# Patient Record
Sex: FEMALE | Race: BLACK OR AFRICAN AMERICAN | NOT HISPANIC OR LATINO | Employment: FULL TIME | URBAN - METROPOLITAN AREA
[De-identification: names, ages, dates, MRNs, and addresses within clinical notes are randomized per-mention and may not be internally consistent; named-entity substitution may affect disease eponyms.]

---

## 2020-07-22 ENCOUNTER — OFFICE VISIT (OUTPATIENT)
Dept: URGENT CARE | Facility: CLINIC | Age: 20
End: 2020-07-22
Payer: COMMERCIAL

## 2020-07-22 VITALS
TEMPERATURE: 99 F | HEIGHT: 60 IN | WEIGHT: 200 LBS | HEART RATE: 104 BPM | DIASTOLIC BLOOD PRESSURE: 84 MMHG | OXYGEN SATURATION: 98 % | SYSTOLIC BLOOD PRESSURE: 125 MMHG | BODY MASS INDEX: 39.27 KG/M2

## 2020-07-22 DIAGNOSIS — L03.90 CELLULITIS, UNSPECIFIED CELLULITIS SITE: Primary | ICD-10-CM

## 2020-07-22 PROCEDURE — 99214 PR OFFICE/OUTPT VISIT, EST, LEVL IV, 30-39 MIN: ICD-10-PCS | Mod: S$GLB,,, | Performed by: INTERNAL MEDICINE

## 2020-07-22 PROCEDURE — 99214 OFFICE O/P EST MOD 30 MIN: CPT | Mod: S$GLB,,, | Performed by: INTERNAL MEDICINE

## 2020-07-22 RX ORDER — MUPIROCIN 20 MG/G
OINTMENT TOPICAL
Qty: 22 G | Refills: 1 | Status: SHIPPED | OUTPATIENT
Start: 2020-07-22 | End: 2021-08-25

## 2020-07-22 RX ORDER — SULFAMETHOXAZOLE AND TRIMETHOPRIM 800; 160 MG/1; MG/1
1 TABLET ORAL 2 TIMES DAILY
Qty: 14 TABLET | Refills: 0 | Status: SHIPPED | OUTPATIENT
Start: 2020-07-22 | End: 2020-07-29

## 2020-07-22 NOTE — PATIENT INSTRUCTIONS
Discharge Instructions for Cellulitis  You have been diagnosed with cellulitis. This is an infection in the deepest layer of the skin. In some cases, the infection also affects the muscle. Cellulitis is caused by bacteria. The bacteria can enter the body through broken skin. This can happen with a cut, scratch, animal bite, or an insect bite that has been scratched. You may have been treated in the hospital with antibiotics and fluids. You will likely be given a prescription for antibiotics to take at home. This sheet will help you take care of yourself at home.  Home care  When you are home:  · Take the prescribed antibiotic medicine you are given as directed until it is gone. Take it even if you feel better. It treats the infection and stops it from returning. Not taking all the medicine can make future infections hard to treat.  · Keep the infected area clean.  · When possible, raise the infected area above the level of your heart. This helps keep swelling down.  · Talk with your healthcare provider if you are in pain. Ask what kind of over-the-counter medicine you can take for pain.  · Apply clean bandages as advised.  · Take your temperature once a day for a week.  · Wash your hands often to prevent spreading the infection.  In the future, wash your hands before and after you touch cuts, scratches, or bandages. This will help prevent infection.   When to call your healthcare provider  Call your healthcare provider immediately if you have any of the following:  · Difficulty or pain when moving the joints above or below the infected area  · Discharge or pus draining from the area  · Fever of 100.4°F (38°C) or higher, or as directed by your healthcare provider  · Pain that gets worse in or around the infected   · Redness that gets worse in or around the infected area, particularly if the area of redness expands to a wider area  · Shaking chills  · Swelling of the infected area  · Vomiting   Date Last Reviewed:  8/1/2016  © 0426-9897 [employer]. [address] [place] [address], [place] [address]. All rights reserved. This information is not intended as a substitute for professional medical care. Always follow your healthcare professional's instructions.

## 2020-07-22 NOTE — PROGRESS NOTES
Subjective:       Patient ID: Sera Chicas is a 20 y.o. female.    Vitals:  height is 5' (1.524 m) and weight is 90.7 kg (200 lb). Her temperature is 98.9 °F (37.2 °C). Her blood pressure is 125/84 and her pulse is 104. Her oxygen saturation is 98%.     Chief Complaint: Rash    Patient presents to clinic today for a bump on her vaginal area that started approximately 1 week ago. Patient states she did start doing sitz baths that seemed to help clear up the symptoms, but it has not fully healed. Patient states she does have some swelling in the area.     Other  This is a new problem. The current episode started in the past 7 days. The problem occurs constantly. The problem has been unchanged. Pertinent negatives include no abdominal pain, anorexia, arthralgias, change in bowel habit, chest pain, chills, congestion, coughing, diaphoresis, fatigue, fever, headaches, joint swelling, myalgias, nausea, neck pain, numbness, rash, sore throat, swollen glands, urinary symptoms, vertigo, visual change, vomiting or weakness. Treatments tried: sitz bath  The treatment provided mild relief.       Constitution: Negative for chills, sweating, fatigue and fever.   HENT: Negative for congestion and sore throat.    Neck: Negative for neck pain and painful lymph nodes.   Cardiovascular: Negative for chest pain.   Respiratory: Negative for cough.    Gastrointestinal: Negative for abdominal pain, nausea and vomiting.   Genitourinary: Positive for vaginal pain. Negative for dysuria, frequency, urgency, urine decreased, hematuria, history of kidney stones, painful menstruation, irregular menstruation, missed menses, heavy menstrual bleeding, ovarian cysts, genital trauma, vaginal discharge, vaginal bleeding, vaginal odor, painful intercourse, genital sore, painful ejaculation and pelvic pain.   Musculoskeletal: Negative for joint pain, joint swelling, back pain and muscle ache.   Skin: Negative for rash and lesion.   Neurological:  Negative for history of vertigo, headaches and numbness.   Hematologic/Lymphatic: Negative for swollen lymph nodes.       Objective:      Physical Exam   Constitutional: She is oriented to person, place, and time. She appears well-developed.   HENT:   Head: Normocephalic and atraumatic.   Ears:   Right Ear: External ear normal.   Left Ear: External ear normal.   Nose: Nose normal.   Mouth/Throat: Mucous membranes are normal.   Eyes: Conjunctivae and lids are normal.   Neck: Trachea normal and full passive range of motion without pain. Neck supple.   Cardiovascular: Normal rate, regular rhythm and normal heart sounds.   Pulmonary/Chest: Effort normal and breath sounds normal. No respiratory distress.   Abdominal: Soft. Normal appearance and bowel sounds are normal. She exhibits no distension, no abdominal bruit, no pulsatile midline mass and no mass. There is no abdominal tenderness.   Musculoskeletal: Normal range of motion.   Neurological: She is alert and oriented to person, place, and time. She has normal strength.   Skin: Skin is warm, dry, intact, not diaphoretic and not pale. Lesions:  lesion (Multiple lesions noted left labia examined with chaperone (Analilia WOODS))Psychiatric: Her speech is normal and behavior is normal. Judgment and thought content normal.   Nursing note and vitals reviewed.        Assessment:       1. Cellulitis, unspecified cellulitis site        Plan:         Cellulitis, unspecified cellulitis site  -     sulfamethoxazole-trimethoprim 800-160mg (BACTRIM DS) 800-160 mg Tab; Take 1 tablet by mouth 2 (two) times daily. for 7 days  Dispense: 14 tablet; Refill: 0  -     mupirocin (BACTROBAN) 2 % ointment; Apply to affected area 3 times daily  Dispense: 22 g; Refill: 1      Patient Instructions       Discharge Instructions for Cellulitis  You have been diagnosed with cellulitis. This is an infection in the deepest layer of the skin. In some cases, the infection also affects the muscle. Cellulitis  is caused by bacteria. The bacteria can enter the body through broken skin. This can happen with a cut, scratch, animal bite, or an insect bite that has been scratched. You may have been treated in the hospital with antibiotics and fluids. You will likely be given a prescription for antibiotics to take at home. This sheet will help you take care of yourself at home.  Home care  When you are home:  · Take the prescribed antibiotic medicine you are given as directed until it is gone. Take it even if you feel better. It treats the infection and stops it from returning. Not taking all the medicine can make future infections hard to treat.  · Keep the infected area clean.  · When possible, raise the infected area above the level of your heart. This helps keep swelling down.  · Talk with your healthcare provider if you are in pain. Ask what kind of over-the-counter medicine you can take for pain.  · Apply clean bandages as advised.  · Take your temperature once a day for a week.  · Wash your hands often to prevent spreading the infection.  In the future, wash your hands before and after you touch cuts, scratches, or bandages. This will help prevent infection.   When to call your healthcare provider  Call your healthcare provider immediately if you have any of the following:  · Difficulty or pain when moving the joints above or below the infected area  · Discharge or pus draining from the area  · Fever of 100.4°F (38°C) or higher, or as directed by your healthcare provider  · Pain that gets worse in or around the infected   · Redness that gets worse in or around the infected area, particularly if the area of redness expands to a wider area  · Shaking chills  · Swelling of the infected area  · Vomiting   Date Last Reviewed: 8/1/2016  © 7723-5262 [employer]. [address] [place] [address], [place] [address]. All rights reserved. This information is not intended as a substitute for professional medical care. Always  follow your healthcare professional's instructions.           My notes were dictated with [employer]ency Software. Any misspellings or nonsensical grammar should be attributed to its use and allowances made for errors and typographic syntactical error(s).

## 2020-07-23 ENCOUNTER — TELEPHONE (OUTPATIENT)
Dept: URGENT CARE | Facility: CLINIC | Age: 20
End: 2020-07-23

## 2020-07-23 RX ORDER — CLINDAMYCIN HYDROCHLORIDE 300 MG/1
300 CAPSULE ORAL 3 TIMES DAILY
Qty: 30 CAPSULE | Refills: 0 | Status: SHIPPED | OUTPATIENT
Start: 2020-07-23 | End: 2020-08-02

## 2021-04-22 ENCOUNTER — OFFICE VISIT (OUTPATIENT)
Dept: URGENT CARE | Facility: CLINIC | Age: 21
End: 2021-04-22
Payer: COMMERCIAL

## 2021-04-22 VITALS
WEIGHT: 200 LBS | TEMPERATURE: 98 F | DIASTOLIC BLOOD PRESSURE: 82 MMHG | SYSTOLIC BLOOD PRESSURE: 128 MMHG | HEART RATE: 84 BPM | BODY MASS INDEX: 39.27 KG/M2 | HEIGHT: 60 IN | RESPIRATION RATE: 18 BRPM | OXYGEN SATURATION: 98 %

## 2021-04-22 DIAGNOSIS — R10.2 VAGINAL PAIN: Primary | ICD-10-CM

## 2021-04-22 DIAGNOSIS — N89.8 VAGINAL DISCHARGE: ICD-10-CM

## 2021-04-22 LAB
B-HCG UR QL: NEGATIVE
BILIRUB UR QL STRIP: NEGATIVE
CTP QC/QA: YES
GLUCOSE UR QL STRIP: NEGATIVE
KETONES UR QL STRIP: NEGATIVE
LEUKOCYTE ESTERASE UR QL STRIP: NEGATIVE
PH, POC UA: 5 (ref 5–8)
POC BLOOD, URINE: NEGATIVE
POC NITRATES, URINE: NEGATIVE
PROT UR QL STRIP: NEGATIVE
SP GR UR STRIP: 1.02 (ref 1–1.03)
UROBILINOGEN UR STRIP-ACNC: NORMAL (ref 0.1–1.1)

## 2021-04-22 PROCEDURE — 81025 URINE PREGNANCY TEST: CPT | Mod: S$GLB,,, | Performed by: PHYSICIAN ASSISTANT

## 2021-04-22 PROCEDURE — 1125F AMNT PAIN NOTED PAIN PRSNT: CPT | Mod: S$GLB,,, | Performed by: PHYSICIAN ASSISTANT

## 2021-04-22 PROCEDURE — 81025 POCT URINE PREGNANCY: ICD-10-PCS | Mod: S$GLB,,, | Performed by: PHYSICIAN ASSISTANT

## 2021-04-22 PROCEDURE — 3008F BODY MASS INDEX DOCD: CPT | Mod: CPTII,S$GLB,, | Performed by: PHYSICIAN ASSISTANT

## 2021-04-22 PROCEDURE — 3008F PR BODY MASS INDEX (BMI) DOCUMENTED: ICD-10-PCS | Mod: CPTII,S$GLB,, | Performed by: PHYSICIAN ASSISTANT

## 2021-04-22 PROCEDURE — 99214 OFFICE O/P EST MOD 30 MIN: CPT | Mod: 25,S$GLB,, | Performed by: PHYSICIAN ASSISTANT

## 2021-04-22 PROCEDURE — 81003 POCT URINALYSIS, DIPSTICK, AUTOMATED, W/O SCOPE: ICD-10-PCS | Mod: QW,S$GLB,, | Performed by: PHYSICIAN ASSISTANT

## 2021-04-22 PROCEDURE — 81003 URINALYSIS AUTO W/O SCOPE: CPT | Mod: QW,S$GLB,, | Performed by: PHYSICIAN ASSISTANT

## 2021-04-22 PROCEDURE — 99214 PR OFFICE/OUTPT VISIT, EST, LEVL IV, 30-39 MIN: ICD-10-PCS | Mod: 25,S$GLB,, | Performed by: PHYSICIAN ASSISTANT

## 2021-04-22 PROCEDURE — 1125F PR PAIN SEVERITY QUANTIFIED, PAIN PRESENT: ICD-10-PCS | Mod: S$GLB,,, | Performed by: PHYSICIAN ASSISTANT

## 2021-04-22 RX ORDER — METRONIDAZOLE 500 MG/1
500 TABLET ORAL EVERY 12 HOURS
Qty: 14 TABLET | Refills: 0 | Status: SHIPPED | OUTPATIENT
Start: 2021-04-22 | End: 2021-04-29

## 2021-04-22 RX ORDER — DOXYCYCLINE 100 MG/1
100 CAPSULE ORAL 2 TIMES DAILY
Qty: 14 CAPSULE | Refills: 0 | Status: SHIPPED | OUTPATIENT
Start: 2021-04-22 | End: 2021-04-29

## 2021-04-22 RX ORDER — FLUCONAZOLE 150 MG/1
150 TABLET ORAL ONCE
Qty: 1 TABLET | Refills: 1 | Status: SHIPPED | OUTPATIENT
Start: 2021-04-22 | End: 2021-04-22

## 2021-04-26 ENCOUNTER — TELEPHONE (OUTPATIENT)
Dept: URGENT CARE | Facility: CLINIC | Age: 21
End: 2021-04-26

## 2021-04-26 LAB
A VAGINAE DNA VAG QL NAA+PROBE: ABNORMAL SCORE
BVAB2 DNA VAG QL NAA+PROBE: ABNORMAL SCORE
C ALBICANS DNA VAG QL NAA+PROBE: NEGATIVE
C GLABRATA DNA VAG QL NAA+PROBE: NEGATIVE
C TRACH DNA VAG QL NAA+PROBE: NEGATIVE
MEGA1 DNA VAG QL NAA+PROBE: ABNORMAL SCORE
N GONORRHOEA DNA VAG QL NAA+PROBE: NEGATIVE
T VAGINALIS DNA VAG QL NAA+PROBE: NEGATIVE

## 2021-04-27 ENCOUNTER — TELEPHONE (OUTPATIENT)
Dept: URGENT CARE | Facility: CLINIC | Age: 21
End: 2021-04-27

## 2021-05-17 ENCOUNTER — OFFICE VISIT (OUTPATIENT)
Dept: URGENT CARE | Facility: CLINIC | Age: 21
End: 2021-05-17
Payer: COMMERCIAL

## 2021-05-17 VITALS
SYSTOLIC BLOOD PRESSURE: 147 MMHG | DIASTOLIC BLOOD PRESSURE: 99 MMHG | HEART RATE: 67 BPM | HEIGHT: 60 IN | BODY MASS INDEX: 38.87 KG/M2 | OXYGEN SATURATION: 99 % | TEMPERATURE: 98 F | WEIGHT: 198 LBS | RESPIRATION RATE: 16 BRPM

## 2021-05-17 DIAGNOSIS — R51.9 ACUTE NONINTRACTABLE HEADACHE, UNSPECIFIED HEADACHE TYPE: Primary | ICD-10-CM

## 2021-05-17 PROCEDURE — 3008F BODY MASS INDEX DOCD: CPT | Mod: CPTII,S$GLB,, | Performed by: FAMILY MEDICINE

## 2021-05-17 PROCEDURE — 3008F PR BODY MASS INDEX (BMI) DOCUMENTED: ICD-10-PCS | Mod: CPTII,S$GLB,, | Performed by: FAMILY MEDICINE

## 2021-05-17 PROCEDURE — 99213 PR OFFICE/OUTPT VISIT, EST, LEVL III, 20-29 MIN: ICD-10-PCS | Mod: S$GLB,,, | Performed by: FAMILY MEDICINE

## 2021-05-17 PROCEDURE — 99213 OFFICE O/P EST LOW 20 MIN: CPT | Mod: S$GLB,,, | Performed by: FAMILY MEDICINE

## 2021-05-17 RX ORDER — PROMETHAZINE HYDROCHLORIDE 25 MG/1
25 TABLET ORAL NIGHTLY
Qty: 30 TABLET | Refills: 1 | Status: SHIPPED | OUTPATIENT
Start: 2021-05-17 | End: 2021-08-25

## 2021-08-19 PROBLEM — N61.1 ABSCESS OF LEFT BREAST: Status: ACTIVE | Noted: 2021-08-19

## 2021-08-25 PROBLEM — N60.82 SEBACEOUS CYST OF SKIN OF LEFT BREAST: Status: ACTIVE | Noted: 2021-08-25

## 2021-08-25 PROBLEM — D24.2 FIBROADENOMA OF BREAST, LEFT: Status: ACTIVE | Noted: 2021-08-25

## 2022-05-13 ENCOUNTER — OFFICE VISIT (OUTPATIENT)
Dept: URGENT CARE | Facility: CLINIC | Age: 22
End: 2022-05-13

## 2022-05-13 VITALS
OXYGEN SATURATION: 96 % | RESPIRATION RATE: 16 BRPM | SYSTOLIC BLOOD PRESSURE: 117 MMHG | DIASTOLIC BLOOD PRESSURE: 79 MMHG | TEMPERATURE: 98 F | HEART RATE: 74 BPM

## 2022-05-13 DIAGNOSIS — J32.9 SINUSITIS, UNSPECIFIED CHRONICITY, UNSPECIFIED LOCATION: ICD-10-CM

## 2022-05-13 DIAGNOSIS — B34.9 VIRAL SYNDROME: Primary | ICD-10-CM

## 2022-05-13 DIAGNOSIS — R51.9 NONINTRACTABLE HEADACHE, UNSPECIFIED CHRONICITY PATTERN, UNSPECIFIED HEADACHE TYPE: ICD-10-CM

## 2022-05-13 DIAGNOSIS — R52 GENERALIZED BODY ACHES: ICD-10-CM

## 2022-05-13 DIAGNOSIS — R11.0 NAUSEA: ICD-10-CM

## 2022-05-13 LAB
CTP QC/QA: YES
POC MOLECULAR INFLUENZA A AGN: NEGATIVE
POC MOLECULAR INFLUENZA B AGN: NEGATIVE

## 2022-05-13 PROCEDURE — 87502 INFLUENZA DNA AMP PROBE: CPT | Mod: QW,TIER,S$GLB, | Performed by: PHYSICIAN ASSISTANT

## 2022-05-13 PROCEDURE — 99203 OFFICE O/P NEW LOW 30 MIN: CPT | Mod: TIER,S$GLB,, | Performed by: PHYSICIAN ASSISTANT

## 2022-05-13 PROCEDURE — 99203 PR OFFICE/OUTPT VISIT, NEW, LEVL III, 30-44 MIN: ICD-10-PCS | Mod: TIER,S$GLB,, | Performed by: PHYSICIAN ASSISTANT

## 2022-05-13 PROCEDURE — 87502 POCT INFLUENZA A/B MOLECULAR: ICD-10-PCS | Mod: QW,TIER,S$GLB, | Performed by: PHYSICIAN ASSISTANT

## 2022-05-13 RX ORDER — IBUPROFEN 800 MG/1
800 TABLET ORAL 3 TIMES DAILY
Qty: 30 TABLET | Refills: 0 | Status: SHIPPED | OUTPATIENT
Start: 2022-05-13 | End: 2022-12-20

## 2022-05-13 RX ORDER — ONDANSETRON 4 MG/1
4 TABLET, ORALLY DISINTEGRATING ORAL EVERY 8 HOURS PRN
Qty: 12 TABLET | Refills: 0 | Status: SHIPPED | OUTPATIENT
Start: 2022-05-13 | End: 2022-12-20

## 2022-05-13 RX ORDER — ELETRIPTAN HYDROBROMIDE 20 MG/1
20 TABLET, FILM COATED ORAL
Qty: 15 TABLET | Refills: 0 | Status: SHIPPED | OUTPATIENT
Start: 2022-05-13 | End: 2022-12-20

## 2022-05-13 RX ORDER — PREDNISONE 20 MG/1
20 TABLET ORAL 2 TIMES DAILY
Qty: 10 TABLET | Refills: 0 | Status: SHIPPED | OUTPATIENT
Start: 2022-05-13 | End: 2022-05-18

## 2022-05-13 NOTE — PROGRESS NOTES
Subjective:       Patient ID: Sera Chicas is a 21 y.o. female.    Vitals:  temperature is 98.3 °F (36.8 °C). Her blood pressure is 117/79 and her pulse is 74. Her respiration is 16 and oxygen saturation is 96%.     Chief Complaint: Generalized Body Aches    Pt presents to urgent care for evaluation of headache, bodyaches, sinus congestion, post nasal drip, sweats/chills x 7 days off and on.  She reports that her symptoms seemed consistent with previous migraines.  She reports light sensitivity however denies any vision changes or dizziness.  She has felt nauseated however denies any emetic episodes, abdominal pain, or diarrhea.  She denies any sore throat or cough.   Pt has had covid vaccines. Pain 7/10.  Pt had a negative home covid test last night.    Other  This is a new problem. The current episode started in the past 7 days. The problem occurs constantly. The problem has been gradually worsening. Associated symptoms include chills, congestion, headaches, myalgias and nausea. Pertinent negatives include no abdominal pain, chest pain, coughing, fever, rash, sore throat or vomiting. Treatments tried: excedrin, ibuprofin. The treatment provided mild relief.       Constitution: Positive for chills. Negative for fever.   HENT: Positive for congestion. Negative for sore throat.    Cardiovascular: Negative for chest pain.   Respiratory: Negative for cough and shortness of breath.    Gastrointestinal: Positive for nausea. Negative for abdominal pain, vomiting, constipation and diarrhea.   Musculoskeletal: Positive for muscle ache.   Skin: Negative for rash.   Neurological: Positive for headaches.       Objective:      Physical Exam   Constitutional: She is oriented to person, place, and time. She appears well-developed. She is cooperative.  Non-toxic appearance. She does not appear ill. No distress.   HENT:   Head: Normocephalic and atraumatic.   Ears:   Right Ear: Hearing, tympanic membrane, external ear and ear canal  normal. Tympanic membrane is not injected, not erythematous and not bulging. No middle ear effusion. impacted cerumen  Left Ear: Hearing, external ear and ear canal normal. Tympanic membrane is not injected, not erythematous and not bulging.  No middle ear effusion. impacted cerumen  Nose: No mucosal edema, rhinorrhea, nasal deformity or congestion. No epistaxis. Right sinus exhibits maxillary sinus tenderness and frontal sinus tenderness. Left sinus exhibits maxillary sinus tenderness and frontal sinus tenderness.   Mouth/Throat: Uvula is midline, oropharynx is clear and moist and mucous membranes are normal. No trismus in the jaw. Normal dentition. No uvula swelling. No oropharyngeal exudate, posterior oropharyngeal edema, posterior oropharyngeal erythema, tonsillar abscesses or cobblestoning.   Eyes: Conjunctivae and lids are normal. Right eye exhibits no discharge. Left eye exhibits no discharge. No scleral icterus.   Neck: Trachea normal and phonation normal. Neck supple. No edema present. No erythema present. No neck rigidity present.   Cardiovascular: Normal rate, regular rhythm, normal heart sounds and normal pulses.   No murmur heard.Exam reveals no gallop and no friction rub.   Pulmonary/Chest: Effort normal and breath sounds normal. No stridor. No respiratory distress. She has no decreased breath sounds. She has no wheezes. She has no rhonchi. She has no rales.         Comments: NAD; CTA bilaterally.     Abdominal: Normal appearance.   Musculoskeletal: Normal range of motion.         General: No deformity. Normal range of motion.   Lymphadenopathy:        Head (right side): No submandibular and no tonsillar adenopathy present.        Head (left side): No submandibular and no tonsillar adenopathy present.     She has no cervical adenopathy.        Right cervical: No superficial cervical and no posterior cervical adenopathy present.       Left cervical: No superficial cervical and no posterior cervical  adenopathy present.   Neurological: She is alert and oriented to person, place, and time. She displays no weakness, facial symmetry and no dysarthria. No cranial nerve deficit. She exhibits normal muscle tone. Coordination and gait normal.   Skin: Skin is warm, dry, intact, not diaphoretic and not pale.   Psychiatric: Her speech is normal and behavior is normal. Judgment and thought content normal.   Nursing note and vitals reviewed.        Results for orders placed or performed in visit on 05/13/22   POCT Influenza A/B MOLECULAR   Result Value Ref Range    POC Molecular Influenza A Ag Negative Negative, Not Reported    POC Molecular Influenza B Ag Negative Negative, Not Reported     Acceptable Yes        Assessment:       1. Viral syndrome    2. Generalized body aches    3. Nausea    4. Nonintractable headache, unspecified chronicity pattern, unspecified headache type    5. Sinusitis, unspecified chronicity, unspecified location        Plan:     flu testing is negative at this time and reviewed results with patient.  Discussed that her symptoms most consistent with viral syndrome.  To help treat her sinusitis and for inflammation associated with her headache, prednisone prescribed as well as Relpax as abortive migraine therapy.  Zofran prescribed for nausea.  Ibuprofen 800 for any body aches.  Rest and fluids.  Follow up with your family provider for any persistent or worsening symptoms. Patient verbalized understanding and all of their questions were answered.       Viral syndrome    Generalized body aches  -     POCT Influenza A/B MOLECULAR  -     ibuprofen (ADVIL,MOTRIN) 800 MG tablet; Take 1 tablet (800 mg total) by mouth 3 (three) times daily.  Dispense: 30 tablet; Refill: 0    Nausea  -     ondansetron (ZOFRAN-ODT) 4 MG TbDL; Take 1 tablet (4 mg total) by mouth every 8 (eight) hours as needed (nausea).  Dispense: 12 tablet; Refill: 0    Nonintractable headache, unspecified chronicity pattern,  unspecified headache type  -     predniSONE (DELTASONE) 20 MG tablet; Take 1 tablet (20 mg total) by mouth 2 (two) times daily. for 5 days  Dispense: 10 tablet; Refill: 0  -     eletriptan (RELPAX) 20 MG tablet; Take 1 tablet (20 mg total) by mouth as needed for Migraine. may repeat in 2 hours if necessary. Do not take more than 2 tab in 24 hours.  Dispense: 15 tablet; Refill: 0    Sinusitis, unspecified chronicity, unspecified location      Patient Instructions     Patient Instructions   -Below are suggestions for symptomatic relief:              -Tylenol every 4 hours OR ibuprofen every 6 hours as needed for pain/fever.              -Salt water gargles to soothe throat pain.              -Chloroseptic spray also helps to numb throat pain.              -Nasal saline spray reduces inflammation and dryness.              -Warm face compresses to help with facial sinus pain/pressure.              -Vicks vapor rub at night.              -Flonase OTC or Nasacort OTC for nasal congestion.              -Simple foods like chicken noodle soup.              -Delsym helps with coughing at night              -Zyrtec/Claritin during the day & Benadryl at night may help with allergies.                If you DO NOT have Hypertension or any history of palpitations, it is ok to take over the counter Sudafed or Mucinex D or Allegra-D or Claritin-D or Zyrtec-D.  If you do take one of the above, it is ok to combine that with plain over the counter Mucinex or Allegra or Claritin or Zyrtec. If, for example, you are taking Zyrtec -D, you can combine that with Mucinex, but not Mucinex-D.  If you are taking Mucinex-D, you can combine that with plain Allegra or Claritin or Zyrtec.   If you DO have Hypertension or palpitations, it is safe to take Coricidin HBP for relief of sinus symptoms.    Please follow up with your Primary care provider within 2-5 days if your signs and symptoms have not resolved or worsen.     If your condition worsens  or fails to improve we recommend that you receive another evaluation at the emergency room immediately or contact your primary medical clinic to discuss your concerns.   You must understand that you have received an Urgent Care treatment only and that you may be released before all of your medical problems are known or treated. You, the patient, will arrange for follow up care as instructed.     RED FLAGS/WARNING SYMPTOMS DISCUSSED WITH PATIENT THAT WOULD WARRANT EMERGENT MEDICAL ATTENTION. PATIENT VERBALIZED UNDERSTANDING.

## 2022-05-13 NOTE — LETTER
May 13, 2022      [facility]  [address] [place] [address], [address]  [place] [address]  Phone: [phone]  Fax: [phone]       Patient: Sera Chicas   YOB: 2000  Date of Visit: 05/13/2022    To Whom It May Concern:    Rosalio Chicas  was at [facility] on 05/13/2022. The patient may return to work/school on 5/16/2022 with no restrictions. If you have any questions or concerns, or if I can be of further assistance, please do not hesitate to contact me.    Sincerely,      Tessa Wiggins PA-C

## 2023-08-28 ENCOUNTER — OFFICE VISIT (OUTPATIENT)
Dept: URGENT CARE | Facility: CLINIC | Age: 23
End: 2023-08-28
Payer: COMMERCIAL

## 2023-08-28 VITALS
HEIGHT: 60 IN | SYSTOLIC BLOOD PRESSURE: 110 MMHG | WEIGHT: 207 LBS | TEMPERATURE: 99 F | BODY MASS INDEX: 40.64 KG/M2 | HEART RATE: 78 BPM | OXYGEN SATURATION: 96 % | DIASTOLIC BLOOD PRESSURE: 72 MMHG | RESPIRATION RATE: 20 BRPM

## 2023-08-28 DIAGNOSIS — J02.9 SORE THROAT: ICD-10-CM

## 2023-08-28 DIAGNOSIS — R05.9 COUGH, UNSPECIFIED TYPE: ICD-10-CM

## 2023-08-28 DIAGNOSIS — U07.1 COVID: Primary | ICD-10-CM

## 2023-08-28 DIAGNOSIS — J45.20 MILD INTERMITTENT ASTHMA WITHOUT COMPLICATION: ICD-10-CM

## 2023-08-28 DIAGNOSIS — U07.1 COVID-19 VIRUS DETECTED: ICD-10-CM

## 2023-08-28 LAB
CTP QC/QA: YES
CTP QC/QA: YES
MOLECULAR STREP A: NEGATIVE
SARS-COV-2 RDRP RESP QL NAA+PROBE: POSITIVE

## 2023-08-28 PROCEDURE — 87635 SARS-COV-2 COVID-19 AMP PRB: CPT | Mod: PBBFAC | Performed by: NURSE PRACTITIONER

## 2023-08-28 PROCEDURE — 99214 OFFICE O/P EST MOD 30 MIN: CPT | Mod: PBBFAC | Performed by: NURSE PRACTITIONER

## 2023-08-28 PROCEDURE — 99213 OFFICE O/P EST LOW 20 MIN: CPT | Mod: S$PBB,,, | Performed by: NURSE PRACTITIONER

## 2023-08-28 PROCEDURE — 87651 STREP A DNA AMP PROBE: CPT | Mod: PBBFAC | Performed by: NURSE PRACTITIONER

## 2023-08-28 PROCEDURE — 99213 PR OFFICE/OUTPT VISIT, EST, LEVL III, 20-29 MIN: ICD-10-PCS | Mod: S$PBB,,, | Performed by: NURSE PRACTITIONER

## 2023-08-28 RX ORDER — ALBUTEROL SULFATE 90 UG/1
2 AEROSOL, METERED RESPIRATORY (INHALATION) EVERY 6 HOURS PRN
Qty: 1 G | Refills: 2 | Status: SHIPPED | OUTPATIENT
Start: 2023-08-28

## 2023-08-28 NOTE — LETTER
August 28, 2023      [facility]  [facility] [address]  Phone: [phone]       Patient: Sera Chicas   YOB: 2000  Date of Visit: 08/28/2023    To Whom It May Concern:    Rosalio Chicas  was at [facility] on 08/28/2023. The patient may return to work/school on 9/2/2023 with no restrictions. If you have any questions or concerns, or if I can be of further assistance, please do not hesitate to contact me.    Sincerely,    MATTIE Cordon

## 2023-08-28 NOTE — PROGRESS NOTES
Subjective:      Patient ID: Sera Chicas is a 23 y.o. female.    Vitals:  height is 5' (1.524 m) and weight is 93.9 kg (207 lb). Her oral temperature is 98.8 °F (37.1 °C). Her blood pressure is 110/72 and her pulse is 78. Her respiration is 20 and oxygen saturation is 96%.     Chief Complaint: Covid Exposure, Nasal Congestion, Cough, Headache, Sore Throat, Fatigue (Started 3 days ago. ), and Shortness of Breath (History of Asthma)    HPI as stated in CC. Denies shortness of breath currently.    Constitution: Positive for fatigue.   HENT:  Positive for sore throat.    Respiratory:  Positive for cough and shortness of breath.    Neurological:  Positive for headaches.      Objective:     Physical Exam   Constitutional: She appears well-developed.  Non-toxic appearance. She does not appear ill. No distress.   HENT:   Head: Atraumatic.   Ears:   Right Ear: Tympanic membrane normal.   Left Ear: Tympanic membrane normal.   Nose: Congestion present. No rhinorrhea or purulent discharge. Right sinus exhibits no maxillary sinus tenderness and no frontal sinus tenderness. Left sinus exhibits no maxillary sinus tenderness and no frontal sinus tenderness.   Mouth/Throat: Mucous membranes are moist.   Eyes: Conjunctivae are normal. Right eye exhibits no discharge. Left eye exhibits no discharge. Extraocular movement intact   Neck: Neck supple.   Cardiovascular: Normal rate and regular rhythm.   Pulmonary/Chest: Effort normal and breath sounds normal. No stridor. No respiratory distress. She has no wheezes. She has no rhonchi. She has no rales. She exhibits no tenderness.         Comments: Pt is apprehensive about deep breath, denies pleuritic chest pain.    Abdominal: Normal appearance and bowel sounds are normal. She exhibits no distension and no mass. Soft. There is no abdominal tenderness. No hernia.   Musculoskeletal: Normal range of motion.         General: Normal range of motion.   Lymphadenopathy:     She has no cervical  adenopathy.   Neurological: no focal deficit. She is alert.   Skin: Skin is warm, dry and not diaphoretic.   Psychiatric: Her behavior is normal. Mood, judgment and thought content normal.   Nursing note and vitals reviewed.      Assessment:     1. COVID    2. Mild intermittent asthma without complication    3. Cough, unspecified type    4. Sore throat      Results for orders placed or performed in visit on 08/28/23   POCT COVID-19 Rapid Screening   Result Value Ref Range    POC Rapid COVID Positive (A) Negative     Acceptable Yes    POCT Strep A, Molecular   Result Value Ref Range    Molecular Strep A, POC Negative Negative     Acceptable Yes        Plan:     - OTC cold/flu products as desired for symptoms  - Plenty of fluids  - Home from work/school  - Tylenol or Motrin for pain/fever    Go to the ER if you experience chest pain with shortness of breath, shortness of breath when moving around your house, high fevers 103.0+, excessive vomiting/diarrhea, or general distress.     COVID  -     nirmatrelvir-ritonavir 150-100 mg DsPk; Take 1 tablet by mouth 2 (two) times daily. Take 2 tablets by mouth 2 (two) times daily for 5 days. Each dose contains 1 nirmatrelvir (pink tablet) and 1 ritonavir (white tablet). Take both tablets together for 5 days  Dispense: 20 tablet; Refill: 0    Mild intermittent asthma without complication  -     albuterol (PROVENTIL HFA) 90 mcg/actuation inhaler; Inhale 2 puffs into the lungs every 6 (six) hours as needed for Wheezing. Rescue  Dispense: 1 g; Refill: 2    Cough, unspecified type  -     POCT COVID-19 Rapid Screening  -     albuterol (PROVENTIL HFA) 90 mcg/actuation inhaler; Inhale 2 puffs into the lungs every 6 (six) hours as needed for Wheezing. Rescue  Dispense: 1 g; Refill: 2    Sore throat  -     POCT Strep A, Molecular

## 2023-08-28 NOTE — PATIENT INSTRUCTIONS
Please see provided patient education for guidance.  - OTC cold/flu products as desired for symptoms  - Plenty of fluids  - Home from work/school  - Tylenol or Motrin for pain/fever    I recommend a 7 day quarantine from day of symptom onset.   COVID is contagious for an average of EIGHT DAYS, starting from Day 1 that you have symptoms.  You can spread COVID 2-3 days before you have symptoms.  The CDC permits 5 days of quarantine. If you choose to quarantine for 5 days, wear a hospital-grade, surgical mask over your nose and mouth when around other people and in public through day 8.  A cloth mask provides no protection from spreading or mac COVID.    Go to the ER if you experience chest pain with shortness of breath, shortness of breath when moving around your house, high fevers 103.0+, excessive vomiting/diarrhea, or general distress.

## 2023-09-06 ENCOUNTER — OFFICE VISIT (OUTPATIENT)
Dept: URGENT CARE | Facility: CLINIC | Age: 23
End: 2023-09-06
Payer: COMMERCIAL

## 2023-09-06 VITALS
RESPIRATION RATE: 18 BRPM | HEART RATE: 109 BPM | TEMPERATURE: 98 F | HEIGHT: 62 IN | OXYGEN SATURATION: 100 % | DIASTOLIC BLOOD PRESSURE: 82 MMHG | SYSTOLIC BLOOD PRESSURE: 116 MMHG | BODY MASS INDEX: 37.54 KG/M2 | WEIGHT: 204 LBS

## 2023-09-06 DIAGNOSIS — R42 DIZZINESS: Primary | ICD-10-CM

## 2023-09-06 PROCEDURE — 99203 OFFICE O/P NEW LOW 30 MIN: CPT | Mod: S$PBB,,, | Performed by: FAMILY MEDICINE

## 2023-09-06 PROCEDURE — 99203 PR OFFICE/OUTPT VISIT, NEW, LEVL III, 30-44 MIN: ICD-10-PCS | Mod: S$PBB,,, | Performed by: FAMILY MEDICINE

## 2023-09-06 PROCEDURE — 99215 OFFICE O/P EST HI 40 MIN: CPT | Mod: PBBFAC | Performed by: FAMILY MEDICINE

## 2023-09-06 RX ORDER — IPRATROPIUM BROMIDE AND ALBUTEROL SULFATE 2.5; .5 MG/3ML; MG/3ML
SOLUTION RESPIRATORY (INHALATION)
COMMUNITY
Start: 2023-08-30

## 2023-09-07 NOTE — PROGRESS NOTES
"Subjective:       Patient ID: Sera Chicas is a 23 y.o. female.    Vitals:  height is 5' 2" (1.575 m) and weight is 92.5 kg (204 lb). Her oral temperature is 98.2 °F (36.8 °C). Her blood pressure is 116/82 and her pulse is 109. Her respiration is 18 and oxygen saturation is 100%.     Chief Complaint: Other Misc (Sudden bouts of dizziness throughout the day where vision is blurred and feel light headed. - Entered by patient), Dizziness, and Blurred Vision    Patient with recent COVID-19, states she took steroids, feeling better in that regard.  But has been having episodic disequilibrium for several days, occasionally associated with brief headaches.  No other neuro symptoms.  Denies fever chills, no visual change, no neck pain or sore throat.  Denies palpitations.  No specific orthostatics association.    Dizziness:    Associated symptoms: headaches and light-headedness.no tinnitus, no vomiting, no visual disturbances, no syncope, no palpitations, no panic, no slurred speech, no numbness in extremities and no chest pain.        HENT:  Negative for tinnitus.    Cardiovascular:  Negative for chest pain, palpitations and passing out.   Gastrointestinal:  Negative for vomiting.   Neurological:  Positive for dizziness, light-headedness and headaches.         Objective:   Physical Exam   Constitutional: She is oriented to person, place, and time. She appears well-developed.  Non-toxic appearance. She does not appear ill. No distress.   HENT:   Head: Atraumatic.   Nose: No purulent discharge. Right sinus exhibits no maxillary sinus tenderness and no frontal sinus tenderness. Left sinus exhibits no maxillary sinus tenderness and no frontal sinus tenderness.   Eyes: Right eye exhibits no discharge. Left eye exhibits no discharge. Extraocular movement intact   Neck: Neck supple.   Cardiovascular: Regular rhythm.   Pulmonary/Chest: Effort normal and breath sounds normal. No respiratory distress. She has no wheezes. She has no " rales.   Abdominal: Normal appearance.   Lymphadenopathy:     She has no cervical adenopathy.   Neurological: no focal deficit. She is alert and oriented to person, place, and time. She displays no weakness. No cranial nerve deficit or sensory deficit. Gait normal.   Skin: Skin is warm, dry and not diaphoretic. Capillary refill takes less than 2 seconds.   Psychiatric: Her behavior is normal.   Nursing note and vitals reviewed.        Assessment:     1. Dizziness            Plan:   Patient in no acute distress, currently asymptomatic, vitals are stable.  Had a lengthy discussion about potential etiologies.  Given recent COVID-19 along with current symptoms, I recommended that she be evaluated in an ER.  She agreed, states she will proceed there now.  Dizziness        Please note: This chart was completed via voice to text dictation. It may contain typographical/word recognition errors. If there are any questions, please contact the provider for final clarification.

## 2023-10-02 ENCOUNTER — OFFICE VISIT (OUTPATIENT)
Dept: URGENT CARE | Facility: CLINIC | Age: 23
End: 2023-10-02
Payer: COMMERCIAL

## 2023-10-02 VITALS
RESPIRATION RATE: 16 BRPM | HEART RATE: 86 BPM | BODY MASS INDEX: 38.24 KG/M2 | HEIGHT: 62 IN | DIASTOLIC BLOOD PRESSURE: 85 MMHG | WEIGHT: 207.81 LBS | OXYGEN SATURATION: 99 % | SYSTOLIC BLOOD PRESSURE: 125 MMHG | TEMPERATURE: 98 F

## 2023-10-02 DIAGNOSIS — Z87.898 HISTORY OF FIBROCYSTIC DISEASE OF BREAST: ICD-10-CM

## 2023-10-02 DIAGNOSIS — N64.59 BLEEDING FROM NIPPLE IN FEMALE: Primary | ICD-10-CM

## 2023-10-02 PROCEDURE — 99215 OFFICE O/P EST HI 40 MIN: CPT | Mod: PBBFAC

## 2023-10-02 PROCEDURE — 99213 OFFICE O/P EST LOW 20 MIN: CPT | Mod: S$PBB,,,

## 2023-10-02 PROCEDURE — 99213 PR OFFICE/OUTPT VISIT, EST, LEVL III, 20-29 MIN: ICD-10-PCS | Mod: S$PBB,,,

## 2023-10-02 RX ORDER — DOXYCYCLINE HYCLATE 100 MG
100 TABLET ORAL 2 TIMES DAILY
Qty: 14 TABLET | Refills: 0 | Status: SHIPPED | OUTPATIENT
Start: 2023-10-02 | End: 2023-10-09

## 2023-10-02 NOTE — PROGRESS NOTES
"Subjective:      Patient ID: Sera Chicas is a 23 y.o. female.    Vitals:  height is 5' 2" (1.575 m) and weight is 94.3 kg (207 lb 12.8 oz). Her temperature is 98.2 °F (36.8 °C). Her blood pressure is 125/85 and her pulse is 86. Her respiration is 16 and oxygen saturation is 99%.     Chief Complaint: Other Misc (Bleeding from nipple - Entered by patient. Since Saturday. Hx of Fibroadenoma)    PT states bleeding from her nipple since Saturday. Hx of fibroadenoma. States some tenderness and swelling to bilateral breasts for years, due for another US, needs PCP referral.        Constitution: Negative.   HENT: Negative.     Neck: neck negative.   Cardiovascular: Negative.    Eyes: Negative.    Respiratory: Negative.     Gastrointestinal: Negative.    Genitourinary: Negative.    Musculoskeletal: Negative.    Skin: Negative.    Neurological: Negative.       Objective:     Physical Exam   Constitutional: She is oriented to person, place, and time.   HENT:   Head: Normocephalic.   Nose: Nose normal.   Mouth/Throat: Mucous membranes are moist. Oropharynx is clear.   Eyes: Pupils are equal, round, and reactive to light.   Neck: Neck supple.   Cardiovascular: Normal rate and normal pulses.   Pulmonary/Chest: Effort normal. Right breast exhibits inverted nipple. Left breast exhibits inverted nipple and nipple discharge. There is breast tenderness.       Abdominal: Normal appearance. Soft.   Genitourinary: There is breast tenderness.   Musculoskeletal: Normal range of motion.         General: Normal range of motion.   Neurological: She is alert and oriented to person, place, and time.   Skin: Skin is warm and dry.   Vitals reviewed.      Assessment:     1. Bleeding from nipple in female    2. History of fibrocystic disease of breast        Plan:       Bleeding from nipple in female  -     doxycycline (VIBRA-TABS) 100 MG tablet; Take 1 tablet (100 mg total) by mouth 2 (two) times daily. for 7 days  Dispense: 14 tablet; Refill: " 0  -     Mammo Digital Diagnostic Left with Twan; Future; Expected date: 10/02/2023  -     US Breast Left Limited; Future; Expected date: 10/02/2023  -     Ambulatory referral/consult to Family Practice  -     Ambulatory referral/consult to Breast Surgery    History of fibrocystic disease of breast  -     Ambulatory referral/consult to Family Practice  -     Ambulatory referral/consult to Breast Surgery      ER precautions given, patient verbalized understanding.     Please see provided patient education for guidance.    Follow up with PCP or return to clinic if symptoms worsen or do not improve.

## 2023-10-10 ENCOUNTER — PATIENT MESSAGE (OUTPATIENT)
Dept: FAMILY MEDICINE | Facility: CLINIC | Age: 23
End: 2023-10-10
Payer: COMMERCIAL

## 2023-10-11 ENCOUNTER — ON-DEMAND VIRTUAL (OUTPATIENT)
Dept: URGENT CARE | Facility: CLINIC | Age: 23
End: 2023-10-11
Payer: COMMERCIAL

## 2023-10-11 DIAGNOSIS — B37.31 YEAST VAGINITIS: Primary | ICD-10-CM

## 2023-10-11 PROCEDURE — 99213 OFFICE O/P EST LOW 20 MIN: CPT | Mod: 95,,, | Performed by: NURSE PRACTITIONER

## 2023-10-11 PROCEDURE — 99213 PR OFFICE/OUTPT VISIT, EST, LEVL III, 20-29 MIN: ICD-10-PCS | Mod: 95,,, | Performed by: NURSE PRACTITIONER

## 2023-10-11 RX ORDER — FLUCONAZOLE 150 MG/1
150 TABLET ORAL DAILY
Qty: 1 TABLET | Refills: 0 | Status: SHIPPED | OUTPATIENT
Start: 2023-10-11 | End: 2023-10-12

## 2023-10-11 NOTE — PROGRESS NOTES
Subjective:      Patient ID: Sera Chicas is a 23 y.o. female.    Vitals:  vitals were not taken for this visit.     Chief Complaint: Vaginitis      Visit Type: TELE AUDIOVISUAL    Present with the patient at the time of consultation: TELEMED PRESENT WITH PATIENT: None    Past Medical History:   Diagnosis Date    Asthma     Breast cyst, left      History reviewed. No pertinent surgical history.  Review of patient's allergies indicates:   Allergen Reactions    Fish containing products Anaphylaxis    Shellfish containing products Anaphylaxis     ALL SEAFOOD    Shellfish derived Anaphylaxis    Tree nuts Anaphylaxis     ALL NUTS    Bactrim [sulfamethoxazole-trimethoprim]     Penicillins      Current Outpatient Medications on File Prior to Visit   Medication Sig Dispense Refill    albuterol (PROVENTIL HFA) 90 mcg/actuation inhaler Inhale 2 puffs into the lungs every 6 (six) hours as needed for Wheezing. Rescue 1 g 2    albuterol (PROVENTIL) 2.5 mg /3 mL (0.083 %) nebulizer solution Take 3 mLs (2.5 mg total) by nebulization every 6 (six) hours as needed for Wheezing. Rescue 120 each 0    albuterol (PROVENTIL/VENTOLIN HFA) 90 mcg/actuation inhaler Inhale 2 puffs into the lungs every 4 (four) hours as needed for Wheezing or Shortness of Breath. (Patient not taking: Reported on 10/2/2023) 18 g 5    albuterol-ipratropium (DUO-NEB) 2.5 mg-0.5 mg/3 mL nebulizer solution Take by nebulization.      azelastine (ASTELIN) 137 mcg (0.1 %) nasal spray 1 spray by Nasal route 2 (two) times daily.      fluticasone propionate (FLONASE) 50 mcg/actuation nasal spray 2 sprays once daily.      ibuprofen (ADVIL,MOTRIN) 800 MG tablet Take 800 mg by mouth every 4 to 6 hours as needed.      levocetirizine (XYZAL) 5 MG tablet Take 5 mg by mouth.      montelukast (SINGULAIR) 10 mg tablet Take 1 tablet (10 mg total) by mouth once daily. 90 tablet 1    [DISCONTINUED] EScitalopram oxalate (LEXAPRO) 10 MG tablet Take 1 tablet (10 mg total) by mouth  once daily. (Patient not taking: Reported on 10/2/2023) 30 tablet 3     No current facility-administered medications on file prior to visit.     Family History   Problem Relation Age of Onset    Hypertension Mother     Hypertension Father     Lung disease Father     Cancer Maternal Grandmother     Breast cancer Maternal Grandmother 35    Pancreatic cancer Maternal Aunt     Heart disease Maternal Grandfather        Medications Ordered                [id number] - [place] - [address]   [address] [place] [address]    Telephone: [phone]   Fax: [phone]   Hours: Not open 24 hours                         E-Prescribed (1 of 1)              fluconazole (DIFLUCAN) 150 MG Tab    Sig: Take 1 tablet (150 mg total) by mouth once daily. for 1 day       Start: 10/11/23     Quantity: 1 tablet Refills: 0                           Ohs Peq Odvv Intake    10/11/2023  3:33 PM CDT - Filed by Patient   Describe your reason for todays visit possible yeast infection / vaginal itch   What is your current physical address in the event of a medical emergency?    Are you able to take your vital signs? No   Please attach any relevant images or files          X3 days, has vaginal itching and discomfort. Feels similar to yeast infections she's had in the past. Denies worry for STIs.        Genitourinary:  Positive for vaginal discharge (white and clear). Negative for vaginal pain, vaginal bleeding, vaginal odor and genital sore.        Objective:   The physical exam was conducted virtually.  Physical Exam   Constitutional: She is oriented to person, place, and time. No distress.   HENT:   Head: Normocephalic and atraumatic.   Mouth/Throat: Oropharynx is clear and moist and mucous membranes are normal.   Eyes: Conjunctivae are normal. No scleral icterus.   Pulmonary/Chest: Effort normal. No respiratory distress.   Genitourinary:         Comments: deferred     Musculoskeletal: Normal range of motion.          General: Normal range of motion.   Neurological: She is alert and oriented to person, place, and time.   Skin: Skin is not diaphoretic.   Psychiatric: Her behavior is normal. Judgment and thought content normal.   Vitals reviewed.      Assessment:     1. Yeast vaginitis        Plan:       Yeast vaginitis    Other orders  -     fluconazole (DIFLUCAN) 150 MG Tab; Take 1 tablet (150 mg total) by mouth once daily. for 1 day  Dispense: 1 tablet; Refill: 0      Patient Instructions   Rest. Drink plenty of fluids. Take meds as directed.  Follow-up with gynecologist or in-person urgent care if no improvement in symptoms.    All questions were answered to the best of my abilities and all concerns were addressed at this time.     Follow up:   1. Please schedule a virtual follow up visit as needed.  2. Please see an in-person provider if your symptoms are worsening or not improving in 2-3 days.  3. Please print a copy of this note and send it to your regular doctor or take it to your next visit so it may be included in your medical record.   4. You must understand that you've received Telehealth Urgent Care treatment only and that you may be released before all your medical problems are known or treated. You, the patient, will arrange for follow up care as instructed.  5. Follow up with your PCP or specialty clinic as directed. To schedule an appointment with the appropriate provider with [facility], please call [phone]. For pediatric referrals, please call [phone]  6. Contact customer support at [phone] for questions or concerns  7. For prescription questions or problems, call [phone] anytime for assistance.  8. For [facility] support, call [phone].

## 2023-10-31 ENCOUNTER — HOSPITAL ENCOUNTER (OUTPATIENT)
Dept: RADIOLOGY | Facility: HOSPITAL | Age: 23
Discharge: HOME | End: 2023-10-31
Payer: COMMERCIAL

## 2023-10-31 ENCOUNTER — HOSPITAL ENCOUNTER (OUTPATIENT)
Dept: RADIOLOGY | Facility: HOSPITAL | Age: 23
Discharge: HOME | End: 2023-10-31
Attending: SURGERY
Payer: COMMERCIAL

## 2023-10-31 VITALS — BODY MASS INDEX: 39.08 KG/M2 | WEIGHT: 207 LBS | HEIGHT: 61 IN

## 2023-10-31 DIAGNOSIS — N64.59 BLEEDING FROM NIPPLE IN FEMALE: ICD-10-CM

## 2023-10-31 DIAGNOSIS — N64.52 DISCHARGE FROM LEFT NIPPLE: ICD-10-CM

## 2023-10-31 PROCEDURE — 77066 DX MAMMO INCL CAD BI: CPT | Mod: 26,,, | Performed by: RADIOLOGY

## 2023-10-31 PROCEDURE — 77062 BREAST TOMOSYNTHESIS BI: CPT | Mod: TC

## 2023-10-31 PROCEDURE — 76641 US BREAST BILATERAL COMPLETE: ICD-10-PCS | Mod: 26,50,, | Performed by: RADIOLOGY

## 2023-10-31 PROCEDURE — 77062 MAMMO DIGITAL DIAGNOSTIC BILAT WITH TOMO: ICD-10-PCS | Mod: 26,,, | Performed by: RADIOLOGY

## 2023-10-31 PROCEDURE — 76641 ULTRASOUND BREAST COMPLETE: CPT | Mod: 26,50,, | Performed by: RADIOLOGY

## 2023-10-31 PROCEDURE — 77066 MAMMO DIGITAL DIAGNOSTIC BILAT WITH TOMO: ICD-10-PCS | Mod: 26,,, | Performed by: RADIOLOGY

## 2023-10-31 PROCEDURE — 76641 ULTRASOUND BREAST COMPLETE: CPT | Mod: TC,50

## 2023-10-31 PROCEDURE — 77062 BREAST TOMOSYNTHESIS BI: CPT | Mod: 26,,, | Performed by: RADIOLOGY

## 2023-11-28 ENCOUNTER — HOSPITAL ENCOUNTER (OUTPATIENT)
Dept: RADIOLOGY | Facility: HOSPITAL | Age: 23
Discharge: HOME | End: 2023-11-28
Attending: SURGERY
Payer: COMMERCIAL

## 2023-11-28 DIAGNOSIS — R92.8 ABNORMAL MAMMOGRAM: Primary | ICD-10-CM

## 2023-11-28 DIAGNOSIS — R92.8 ABNORMAL MAMMOGRAM: ICD-10-CM

## 2023-11-28 PROCEDURE — 77066 DX MAMMO INCL CAD BI: CPT | Mod: 26,,, | Performed by: STUDENT IN AN ORGANIZED HEALTH CARE EDUCATION/TRAINING PROGRAM

## 2023-11-28 PROCEDURE — 77062 BREAST TOMOSYNTHESIS BI: CPT | Mod: TC

## 2023-11-28 PROCEDURE — 19083 BX BREAST 1ST LESION US IMAG: CPT | Mod: RT

## 2023-11-28 PROCEDURE — 19083 US BREAST BIOPSY WITH IMAGING 1ST SITE RIGHT: ICD-10-PCS | Mod: RT,,, | Performed by: STUDENT IN AN ORGANIZED HEALTH CARE EDUCATION/TRAINING PROGRAM

## 2023-11-28 PROCEDURE — 19084 BX BREAST ADD LESION US IMAG: CPT | Mod: RT,,, | Performed by: STUDENT IN AN ORGANIZED HEALTH CARE EDUCATION/TRAINING PROGRAM

## 2023-11-28 PROCEDURE — 77062 BREAST TOMOSYNTHESIS BI: CPT | Mod: 26,,, | Performed by: STUDENT IN AN ORGANIZED HEALTH CARE EDUCATION/TRAINING PROGRAM

## 2023-11-28 PROCEDURE — 77066 MAMMO DIGITAL DIAGNOSTIC BILAT WITH TOMO: ICD-10-PCS | Mod: 26,,, | Performed by: STUDENT IN AN ORGANIZED HEALTH CARE EDUCATION/TRAINING PROGRAM

## 2023-11-28 PROCEDURE — 77062 MAMMO DIGITAL DIAGNOSTIC BILAT WITH TOMO: ICD-10-PCS | Mod: 26,,, | Performed by: STUDENT IN AN ORGANIZED HEALTH CARE EDUCATION/TRAINING PROGRAM

## 2023-11-28 PROCEDURE — 19083 BX BREAST 1ST LESION US IMAG: CPT | Mod: LT,,, | Performed by: STUDENT IN AN ORGANIZED HEALTH CARE EDUCATION/TRAINING PROGRAM

## 2023-11-28 PROCEDURE — 19083 US BREAST BIOPSY WITH IMAGING 1ST SITE LEFT: ICD-10-PCS | Mod: LT,,, | Performed by: STUDENT IN AN ORGANIZED HEALTH CARE EDUCATION/TRAINING PROGRAM

## 2023-11-28 PROCEDURE — 19083 BX BREAST 1ST LESION US IMAG: CPT | Mod: RT,,, | Performed by: STUDENT IN AN ORGANIZED HEALTH CARE EDUCATION/TRAINING PROGRAM

## 2023-11-28 PROCEDURE — 19083 BX BREAST 1ST LESION US IMAG: CPT | Mod: LT

## 2023-11-28 PROCEDURE — 19084 US BREAST BIOPSY WITH IMAGING EA ADDITIONAL: ICD-10-PCS | Mod: RT,,, | Performed by: STUDENT IN AN ORGANIZED HEALTH CARE EDUCATION/TRAINING PROGRAM

## 2023-11-28 PROCEDURE — 19084 BX BREAST ADD LESION US IMAG: CPT

## 2023-11-29 DIAGNOSIS — D36.9 INTRADUCTAL PAPILLOMA: Primary | ICD-10-CM

## 2023-11-29 LAB — PSYCHE PATHOLOGY RESULT: NORMAL

## 2023-12-11 NOTE — PROGRESS NOTES
[facility] Breast Surg  Breast Surgical Oncology  New Patient Office Visit - H&P      Referring Provider: Dr. Richelle White   PCP: No, Primary Doctor     Chief Complaint:   Chief Complaint   Patient presents with    Breast Discharge     Patient reports left breast nipple discharge, swelling, bilateral pulling pain with no support/bra x 3 months         Subjective:     HPI:  Sera Chicas is a 23 y.o. female who presents on 12/12/2023 for evaluation of  bilateral papillomas and right breast fibroadenoma . Patient has experienced left bloody nipple discharge for the past several months (Oct 2023). She states it was spontaneous and now occurs mostly with manipulation. She reports a palpable area in the left upper inner breast that can be painful, but also has bilateral breast pain. She also has a history of sebaceous cyst in the lower inner left breast as well. She also has large pendulous breast associated with back and neck pain as well as depression in her shoulders.     She has a family history of breast cancer in a maternal grandmother at age 35 and pancreas cancer in a maternal aunt.    She had previously been seen by Dr. White and was preparing for excision of breast sebaceous cyst  and fibroadenomas as well as bilateral breast reduction in 2021 which had been approved but her insurance changed just prior to the surgery. She has also since moved to [place].      MG breast density: Category D (extremely dense)     Imaging:  10/31/2023 BL DG MG/BL US BREAST COMPLETE  at [facility]-which revealed no suspicious masses, calcifications, or other signs of malignancy are identified.     On L US at 12 o'clock 1 cm FN, there is a 1.1 x 0.2 x 0.8 cm   oval, hypoechoic mass with circumscribed margins (previously 11 x 3 x 8 mm on the 08/25/2021 exam).   At 12 o'clock left breast, 2 cm FN, there is a 0.9 x 0.5 x 0.8 cm oval, hypoechoic mass with circumscribed margins (previously 9 x 4 x 6  mm on the 2021 exam).   At 2 o'clock left breast, 7 cm FN, a benign ductal segment is imaged.     At 3 o'clock left breast, 8 cm FN, there is an 1.8 x 0.3 x 1.2 cm oval, hypoechoic mass with circumscribed margins, not significantly changed compared to the prior examinations.   At 6 o'clock left breast, 3 cm FN, there is a 2.3 x 0.5 x 1.6 cm oval, hypoechoic intraductal mass with indistinct margins.   On R US at 3 o'clock 4 cm FN, there is a 0.5 x 0.3 x  0.4 cm oval, complex mass with circumscribed margins.   In the right breast posterior to the nipple, there is a 0.9 x 0.4 x 0.9 cm oval, hypoechoic intraductal mass with circumscribed margins.     Benign sebaceous cysts are incidentally imaged in the bilateral axillae.  No suspicious lymph nodes are seen in either axilla. BIRADS-4 suspicious; biopsy recommended    Pathology:  2023 Ultrasound-guided Core Needle Biopsy Left Breast 6 o'clock 3 cm FN-        - Intraductal papilloma  2. 2023 Ultrasound-guided Core Needle Biopsy Right Breast 3 o'clock 4 cm FN-      -Intraductal papilloma with focal apocrine metaplasia  3. 2023 Ultrasound-guided Core Needle Biopsy Right Breast PTN Mass-      - Fibroadenoma, no atypia    OB/GYN History:  Age at Menarche Onset: 9  Menopausal Status: premenopausal, LMP: No LMP recorded.  Hysterectomy/Oophorectomy: NA, at age NA  Hormonal birth control (duration): No none.   Pregnancy History:   Age at first live birth: 1  Hormone Replacement Therapy: No, none  Patient did not breast feed.  Patient admits to nipple discharge.   Patient denies to previous breast biopsy.   Patient denies to a personal history of breast cancer.    Other Relevant History:  Prior thoracic RT: none  Genetic testing: Yes Negative (2021)  Ashkenazi Druze descent: No    Family History:  Family History   Problem Relation Age of Onset    Hypertension Mother     Hypertension Father     Lung disease Father     Breast cancer Maternal  Grandmother 35    Cancer Maternal Grandmother     Heart disease Maternal Grandfather     Pancreatic cancer Maternal Aunt 50        Past History:  Past Medical History:   Diagnosis Date    Asthma     Breast cyst, left     History of bilateral breast biopsy 11/28/2023        History reviewed. No pertinent surgical history.     Social History     Socioeconomic History    Marital status: Single   Tobacco Use    Smoking status: Every Day     Types: Vaping with nicotine    Smokeless tobacco: Never   Substance and Sexual Activity    Alcohol use: Yes     Alcohol/week: 4.0 standard drinks of alcohol     Types: 2 Glasses of wine, 2 Drinks containing 0.5 oz of alcohol per week     Comment: rare/seldom    Drug use: Never    Sexual activity: Yes     Partners: Male     Birth control/protection: None        Body mass index is 39.77 kg/m².     Allergy/Medications:   Review of patient's allergies indicates:   Allergen Reactions    Fish containing products Anaphylaxis    Shellfish containing products Anaphylaxis     ALL SEAFOOD    Shellfish derived Anaphylaxis    Tree nuts Anaphylaxis     ALL NUTS    Bactrim [sulfamethoxazole-trimethoprim]     Penicillins           Current Outpatient Medications:     albuterol (PROVENTIL HFA) 90 mcg/actuation inhaler, Inhale 2 puffs into the lungs every 6 (six) hours as needed for Wheezing. Rescue, Disp: 1 g, Rfl: 2    albuterol (PROVENTIL) 2.5 mg /3 mL (0.083 %) nebulizer solution, Take 3 mLs (2.5 mg total) by nebulization every 6 (six) hours as needed for Wheezing. Rescue, Disp: 120 each, Rfl: 0    albuterol-ipratropium (DUO-NEB) 2.5 mg-0.5 mg/3 mL nebulizer solution, Take by nebulization., Disp: , Rfl:     azelastine (ASTELIN) 137 mcg (0.1 %) nasal spray, 1 spray by Nasal route 2 (two) times daily., Disp: , Rfl:     fluticasone propionate (FLONASE) 50 mcg/actuation nasal spray, 2 sprays once daily., Disp: , Rfl:     levocetirizine (XYZAL) 5 MG tablet, Take 5 mg by mouth., Disp: , Rfl:      "montelukast (SINGULAIR) 10 mg tablet, Take 1 tablet (10 mg total) by mouth once daily., Disp: 90 tablet, Rfl: 1    albuterol (PROVENTIL/VENTOLIN HFA) 90 mcg/actuation inhaler, Inhale 2 puffs into the lungs every 4 (four) hours as needed for Wheezing or Shortness of Breath. (Patient not taking: Reported on 10/2/2023), Disp: 18 g, Rfl: 5    ibuprofen (ADVIL,MOTRIN) 800 MG tablet, Take 800 mg by mouth every 4 to 6 hours as needed., Disp: , Rfl:        Review of Systems:  Review of Systems   Constitutional:  Negative for chills, fever and weight loss.   HENT:  Negative for sore throat.    Eyes:  Negative for blurred vision and double vision.   Respiratory:  Negative for cough and shortness of breath.    Cardiovascular:  Negative for chest pain, palpitations and leg swelling.   Gastrointestinal:  Negative for abdominal pain, constipation, diarrhea, heartburn, nausea and vomiting.   Genitourinary:  Negative for dysuria, flank pain, frequency, hematuria and urgency.   Musculoskeletal:  Positive for back pain and neck pain. Negative for joint pain and myalgias.        Secondary to large pendulous breast impacts her daily   Neurological:  Negative for dizziness and headaches.   Endo/Heme/Allergies:  Does not bruise/bleed easily.   Psychiatric/Behavioral:  Negative for depression. The patient is nervous/anxious.           Objective:     Vitals:  Blood pressure 123/84, pulse 98, temperature 97.9 °F (36.6 °C), temperature source Oral, resp. rate 18, height 5' 1" (1.549 m), weight 95.5 kg (210 lb 8 oz), SpO2 100 %.      Physical Exam:  General: The patient is awake, alert and oriented times three. The patient is well nourished and in no acute distress.   Neck: There is no evidence of palpable cervical, supraclavicular or axillary adenopathy. The neck is supple. The thyroid is not enlarged.   Musculoskeletal: The patient has a normal range of motion of her bilateral upper extremities.   Chest: Examination of the chest wall fails " to reveal any obvious abnormalities. The lungs are clear to auscultation bilaterally without rales, rhonchi, or wheezing.   Cardiovascular: The heart has a regular rate and rhythm without murmurs, gallops or rubs.  Breast:  Right: Examination of right breast fails to reveal any dominant masses or areas of significant focal nodularity. The nipple is everted without evidence of discharge. There is no skin dimpling with movement of the pectoralis. There are no significant skin changes overlying the breast. Large pendulous breast. Right shoulder depression from bra.  Left: Examination of the left breast fails to reveal any dominant masses or areas of significant focal nodularity. The nipple is everted without evidence of discharge. There is no skin dimpling with movement of the pectoralis. There are no significant skin changes overlying the breast. Large pendulous breast. Left shoulder depression from bra.  Physical Exam   Pulmonary/Chest:           Abdomen: The abdomen is soft, flat, nontender and nondistended with no palpable masses or organomegaly.  Integumentary: no rashes or skin lesions present  Neurologic: cranial nerves intact, no signs of peripheral neurological deficit, motor/sensory function intact    Assessment and Discussion:       Encounter Diagnoses   Name Primary?    Intraductal papilloma of right breast Yes    Intraductal papilloma of left breast     At high risk for breast cancer     Vaping nicotine dependence, non-tobacco product     Macromastia       Intraductal papillomas consist of a monotonous array of papillary cells that grow from the wall of a cyst into its lumen. Although they are not concerning in and of themselves, they can harbor areas of atypia or ductal carcinoma in situ (DCIS). Papillomas can occur as solitary or multiple lesions.    Solitary lesions -- Solitary intraductal papillomas may be identified as a mass on mammography, ultrasound, magnetic resonance imaging (MRI), or  ductography, or they can be found incidentally. Nipple discharge, particularly bloody nipple discharge, is a frequent clinical presentation.     When a core biopsy demonstrates a papilloma with atypical cells, surgical excision is warranted. Papillary lesions with atypia are upgraded pathologically up to 67 percent of the time. After excision, if there is no upgrading beyond atypia, a discussion about endocrine therapy for breast cancer prevention is indicated.    The data surrounding solitary papillomas without evidence of atypia are less clear. Reported rates of upgrade of pure papillary lesions to atypia or malignancy are highly variable, historically ranging from 5 to 20 percent, but trending down to less than 10 percent in the last decade.      The current American Society of Breast Surgeons guidelines suggest individualizing the decision to excise a papilloma based on such criteria as size, symptoms (eg, palpability or nipple discharge), and breast cancer risk factors. Excision is recommended in cases of atypia, a palpable mass lesion, bloody nipple discharge (primarily for symptomatic relief), and/or pathology-imaging discordance, whereas small incidental benign solitary papillomas without atypia and with imaging concordance may be offered close clinical and imaging follow-up.      She is at high-risk for breast cancer with TC score of 38.1% and recommend BL Breast MRI.     She has large pendulous breast with associated back and neck pain.    I informed her of the complications which include surgical site infections, hematoma or seroma requiring operation, necrosis of nipple-areola and/or mastectomy flaps requiring debridement or hyperbaric therapy, unplanned re-operations, and delay in adjuvant treatments. These complications can be greatly increased by smoking and/or vaping with nicotine. Recommend smoking cessation.           Plan:       Problem List Items Addressed This Visit          Renal/    At high  risk for breast cancer    Current Assessment & Plan     TC Breast Cancer risk - 38.1%  Recommend BL Breast MRI         Intraductal papilloma of right breast - Primary    Current Assessment & Plan     Surgical excision recommended         Relevant Orders    Ambulatory referral/consult to Plastic Surgery    Intraductal papilloma of left breast    Current Assessment & Plan     Surgical - Bilateral excisional biopsy with bilateral seed localization  Preop - CBC, BMP, HCG  Follow-up after Plastic Surgery Referral           Relevant Orders    Ambulatory referral/consult to Plastic Surgery    Macromastia    Current Assessment & Plan     Plastic Surgery referral - re: post-bilateral excisional biopsy breast reduction            Other    Vaping nicotine dependence, non-tobacco product    Current Assessment & Plan     Dangers of cigarette smoking were reviewed with patient in detail. Patient was Counseled for 3-10 minutes. Nicotine replacement options were discussed. Nicotine replacement was discussed- She already had referral to smoking cessation counseling. We will resend referral.                All of questions were answered    Dee Pritchard MD  Breast Surgical Oncology     OFFICE VISIT CODING:    Non-face-to-face time included:  Yes Preparing to see the patient such as reviewing the patient record  Yes Obtaining and reviewing separately obtained history  Yes Independently interpreting results  Yes Documenting clinical information in electronic health record  No Ordering appropriate medications  Yes Ordering appropriate tests  Yes Ordering appropriate procedures (including follow-up)  Yes Referring and communicating with other health care professionals (not separately reported)  Yes Care Coordination (not separately reported)    Face-to-face time included:  Yes Performing a medically necessary appropriate history, examination, and/or evaluation  Yes Communicating results to the patient/family/caregiver  Yes Counseling  and educating the patient/family/caregiver  Yes Answering patient/family/caregiver questions    Total Time: 74 minutes    Total time includes both face-to-face and non-face-to-face time personally spent by myself on the day of the visit.

## 2023-12-12 ENCOUNTER — OFFICE VISIT (OUTPATIENT)
Dept: SURGERY | Facility: CLINIC | Age: 23
End: 2023-12-12
Payer: COMMERCIAL

## 2023-12-12 VITALS
HEIGHT: 61 IN | BODY MASS INDEX: 39.74 KG/M2 | WEIGHT: 210.5 LBS | OXYGEN SATURATION: 100 % | SYSTOLIC BLOOD PRESSURE: 123 MMHG | DIASTOLIC BLOOD PRESSURE: 84 MMHG | TEMPERATURE: 98 F | HEART RATE: 98 BPM | RESPIRATION RATE: 18 BRPM

## 2023-12-12 DIAGNOSIS — D24.1 INTRADUCTAL PAPILLOMA OF RIGHT BREAST: Primary | ICD-10-CM

## 2023-12-12 DIAGNOSIS — F17.200 VAPING NICOTINE DEPENDENCE, NON-TOBACCO PRODUCT: ICD-10-CM

## 2023-12-12 DIAGNOSIS — Z91.89 AT HIGH RISK FOR BREAST CANCER: ICD-10-CM

## 2023-12-12 DIAGNOSIS — N62 MACROMASTIA: ICD-10-CM

## 2023-12-12 DIAGNOSIS — D24.2 INTRADUCTAL PAPILLOMA OF LEFT BREAST: ICD-10-CM

## 2023-12-12 PROCEDURE — 99205 PR OFFICE/OUTPT VISIT, NEW, LEVL V, 60-74 MIN: ICD-10-PCS | Mod: S$GLB,,, | Performed by: SURGERY

## 2023-12-12 PROCEDURE — 3074F SYST BP LT 130 MM HG: CPT | Mod: CPTII,S$GLB,, | Performed by: SURGERY

## 2023-12-12 PROCEDURE — 3008F BODY MASS INDEX DOCD: CPT | Mod: CPTII,S$GLB,, | Performed by: SURGERY

## 2023-12-12 PROCEDURE — 3008F PR BODY MASS INDEX (BMI) DOCUMENTED: ICD-10-PCS | Mod: CPTII,S$GLB,, | Performed by: SURGERY

## 2023-12-12 PROCEDURE — 1159F MED LIST DOCD IN RCRD: CPT | Mod: CPTII,S$GLB,, | Performed by: SURGERY

## 2023-12-12 PROCEDURE — 3079F DIAST BP 80-89 MM HG: CPT | Mod: CPTII,S$GLB,, | Performed by: SURGERY

## 2023-12-12 PROCEDURE — 99999 PR PBB SHADOW E&M-EST. PATIENT-LVL V: CPT | Mod: PBBFAC,,, | Performed by: SURGERY

## 2023-12-12 PROCEDURE — 1160F PR REVIEW ALL MEDS BY PRESCRIBER/CLIN PHARMACIST DOCUMENTED: ICD-10-PCS | Mod: CPTII,S$GLB,, | Performed by: SURGERY

## 2023-12-12 PROCEDURE — 99999 PR PBB SHADOW E&M-EST. PATIENT-LVL V: ICD-10-PCS | Mod: PBBFAC,,, | Performed by: SURGERY

## 2023-12-12 PROCEDURE — 1160F RVW MEDS BY RX/DR IN RCRD: CPT | Mod: CPTII,S$GLB,, | Performed by: SURGERY

## 2023-12-12 PROCEDURE — 3079F PR MOST RECENT DIASTOLIC BLOOD PRESSURE 80-89 MM HG: ICD-10-PCS | Mod: CPTII,S$GLB,, | Performed by: SURGERY

## 2023-12-12 PROCEDURE — 3074F PR MOST RECENT SYSTOLIC BLOOD PRESSURE < 130 MM HG: ICD-10-PCS | Mod: CPTII,S$GLB,, | Performed by: SURGERY

## 2023-12-12 PROCEDURE — 99205 OFFICE O/P NEW HI 60 MIN: CPT | Mod: S$GLB,,, | Performed by: SURGERY

## 2023-12-12 PROCEDURE — 1159F PR MEDICATION LIST DOCUMENTED IN MEDICAL RECORD: ICD-10-PCS | Mod: CPTII,S$GLB,, | Performed by: SURGERY

## 2023-12-13 PROBLEM — D24.2 INTRADUCTAL PAPILLOMA OF LEFT BREAST: Status: ACTIVE | Noted: 2023-12-13

## 2023-12-13 PROBLEM — Z91.89 AT HIGH RISK FOR BREAST CANCER: Status: ACTIVE | Noted: 2023-12-13

## 2023-12-13 PROBLEM — D24.1 INTRADUCTAL PAPILLOMA OF RIGHT BREAST: Status: ACTIVE | Noted: 2023-12-13

## 2023-12-14 PROBLEM — F17.200 VAPING NICOTINE DEPENDENCE, NON-TOBACCO PRODUCT: Status: ACTIVE | Noted: 2023-12-14

## 2023-12-14 PROBLEM — N62 MACROMASTIA: Status: ACTIVE | Noted: 2023-12-14

## 2023-12-14 NOTE — ASSESSMENT & PLAN NOTE
Dangers of cigarette smoking were reviewed with patient in detail. Patient was Counseled for 3-10 minutes. Nicotine replacement options were discussed. Nicotine replacement was discussed- She already had referral to smoking cessation counseling. We will resend referral.  
Plastic Surgery referral - re: post-bilateral excisional biopsy breast reduction  
Surgical - Bilateral excisional biopsy with bilateral seed localization  Preop - CBC, BMP, HCG  Follow-up after Plastic Surgery Referral    
Surgical excision recommended  
TC Breast Cancer risk - 38.1%  Recommend BL Breast MRI  
3

## 2024-01-04 DIAGNOSIS — Z91.89 AT HIGH RISK FOR BREAST CANCER: Primary | ICD-10-CM

## 2024-01-08 ENCOUNTER — PATIENT MESSAGE (OUTPATIENT)
Dept: SURGERY | Facility: CLINIC | Age: 24
End: 2024-01-08
Payer: COMMERCIAL

## 2024-01-08 ENCOUNTER — TELEPHONE (OUTPATIENT)
Dept: SURGERY | Facility: CLINIC | Age: 24
End: 2024-01-08
Payer: COMMERCIAL

## 2024-01-08 NOTE — TELEPHONE ENCOUNTER
I have tried multiple times to reach patient regarding her appointment for her Breast MRI.  Patient's phone goes straight to voice mail, and I unable to leave message.  Patient has been messaged through the portal for her to call me.